# Patient Record
Sex: MALE | Race: WHITE | NOT HISPANIC OR LATINO | Employment: FULL TIME | URBAN - METROPOLITAN AREA
[De-identification: names, ages, dates, MRNs, and addresses within clinical notes are randomized per-mention and may not be internally consistent; named-entity substitution may affect disease eponyms.]

---

## 2017-12-06 ENCOUNTER — ALLSCRIPTS OFFICE VISIT (OUTPATIENT)
Dept: OTHER | Facility: OTHER | Age: 27
End: 2017-12-06

## 2017-12-06 LAB — OCCULT BLD, FECAL IMMUNOLOGICAL (HISTORICAL): NEGATIVE

## 2018-01-22 VITALS
HEIGHT: 69 IN | WEIGHT: 214.4 LBS | TEMPERATURE: 98.2 F | HEART RATE: 74 BPM | SYSTOLIC BLOOD PRESSURE: 138 MMHG | DIASTOLIC BLOOD PRESSURE: 82 MMHG | RESPIRATION RATE: 16 BRPM | BODY MASS INDEX: 31.76 KG/M2

## 2018-08-06 ENCOUNTER — OFFICE VISIT (OUTPATIENT)
Dept: FAMILY MEDICINE CLINIC | Facility: CLINIC | Age: 28
End: 2018-08-06
Payer: COMMERCIAL

## 2018-08-06 VITALS
WEIGHT: 207.4 LBS | TEMPERATURE: 98.3 F | BODY MASS INDEX: 30.72 KG/M2 | SYSTOLIC BLOOD PRESSURE: 130 MMHG | RESPIRATION RATE: 16 BRPM | HEIGHT: 69 IN | DIASTOLIC BLOOD PRESSURE: 80 MMHG | HEART RATE: 72 BPM

## 2018-08-06 DIAGNOSIS — N50.819 TESTICULAR PAIN: ICD-10-CM

## 2018-08-06 DIAGNOSIS — F41.9 ANXIETY: ICD-10-CM

## 2018-08-06 DIAGNOSIS — R03.0 ELEVATED BLOOD PRESSURE READING: Primary | ICD-10-CM

## 2018-08-06 PROBLEM — K64.9 HEMORRHOIDS: Status: ACTIVE | Noted: 2017-12-06

## 2018-08-06 PROCEDURE — 99213 OFFICE O/P EST LOW 20 MIN: CPT | Performed by: FAMILY MEDICINE

## 2018-08-06 PROCEDURE — 3008F BODY MASS INDEX DOCD: CPT | Performed by: FAMILY MEDICINE

## 2018-08-06 PROCEDURE — 1036F TOBACCO NON-USER: CPT | Performed by: FAMILY MEDICINE

## 2018-08-06 PROCEDURE — 93000 ELECTROCARDIOGRAM COMPLETE: CPT | Performed by: FAMILY MEDICINE

## 2018-08-06 NOTE — PATIENT INSTRUCTIONS
Please be sure to keep a log on when and what triggers your anxiety  Please take your blood pressure once a week and specifically the times refill anxious    Please obtain at ultrasound of bilateral testicles  Avoid lifting weights till ultrasound is obtained      If your blood pressure continues to elevate will order a Holter monitor and blood work at her next visit

## 2018-08-06 NOTE — PROGRESS NOTES
Cecil Gosselin 1990 male MRN: [id number]    [facility] ACUTE OFFICE VISIT  [facility] - [address]      ASSESSMENT/PLAN  Cecil Gosselin is a 29 y o  male with no significant PmhX presenting to the office for    1) testicular pain:  -maternal uncle with testicular cancer at the age of 21  Physical exam did not demonstrate any abnormalities however given the family history and patient is concerned will obtain an ultrasound for further evaluation  2) anxiety:  -education given on anxiety at this visit  The patient is to keep a log of what triggers his anxiety  At this time the patient declines therapy  If his anxiety continues to be interrupting his daily activities will consider starting him on an SSRI  3) elevated blood pressure:  EKG within normal limits  At this time would like him to take weekly blood pressures  Will review log at next visit      Disposition:  Return to the office in 1 month    Future Appointments  Date Time Provider David Sanchez   9/5/2018 9:00 AM Mayelin Christian MD [facility]          SUBJECTIVE  CC: Testicle Pain (unfrequently )      HPI:  Cecil Gosselin is a 29 y o  male who presents for an acute appointment  The patient states that he has been having testicular pain on and off  He states that every month he has 1 time testicular pain  He does do testicular checks every month and has not found any masses  The patient has a family history of testicular cancer in his mother's side  Denies any erections/ejaculation concerns  Patient also reports elevated blood pressure with palpitations  He states that he might have anxiety as well secondary to getting   He states that his father and brother has anxiety  He states that he does that time have chest tightness during these anxiety attacks  Denies any shortness of breath or any other complaints at this time    -patient is a weight   -chews tobacco    Review of Systems   Constitutional: Negative for activity change and appetite change  HENT: Negative for congestion and sore throat  Respiratory: Negative for cough, chest tightness and shortness of breath  Cardiovascular: Negative for chest pain  Gastrointestinal: Negative for abdominal distention and abdominal pain  Genitourinary: Positive for scrotal swelling and testicular pain  Musculoskeletal: Negative for arthralgias and back pain  Skin: Negative for rash  Neurological: Negative for dizziness  All other systems reviewed and are negative  Historical Information   The patient history was reviewed as follows:  No past medical history on file  Past Surgical History:   Procedure Laterality Date    APPENDECTOMY       Family History   Problem Relation Age of Onset    Hypertension Father       Social History   History   Alcohol use Not on file     History   Drug use: Unknown     History   Smoking Status    Never Smoker   Smokeless Tobacco    Never Used       Medications:   No current outpatient prescriptions on file  Allergies   Allergen Reactions    Penicillins Rash       OBJECTIVE  Vitals:   Vitals:    08/06/18 1622   BP: 130/80   BP Location: Left arm   Patient Position: Sitting   Cuff Size: Standard   Pulse: 72   Resp: 16   Temp: 98 3 °F (36 8 °C)   Weight: 94 1 kg (207 lb 6 4 oz)   Height: 5' 8 5" (1 74 m)         Physical Exam   Constitutional: He is oriented to person, place, and time  He appears well-developed and well-nourished  HENT:   Head: Normocephalic and atraumatic  Eyes: Conjunctivae and EOM are normal  Pupils are equal, round, and reactive to light  Neck: Normal range of motion  Neck supple  Cardiovascular: Normal rate, regular rhythm, normal heart sounds and intact distal pulses  No murmur heard  Pulmonary/Chest: Effort normal and breath sounds normal  No respiratory distress  He has no wheezes  Abdominal: Soft   Bowel sounds are normal  He exhibits no distension  There is no tenderness  Genitourinary: Penis normal  No penile tenderness  Genitourinary Comments: No hernia/masses felt   Musculoskeletal: Normal range of motion  He exhibits no edema  Neurological: He is alert and oriented to person, place, and time  Skin: Skin is warm and dry  No rash noted  Psychiatric: He has a normal mood and affect  His behavior is normal  Judgment and thought content normal    Vitals reviewed           Jason Hunt MD,   [facility]  8/6/2018

## 2018-11-28 DIAGNOSIS — N50.812 LEFT TESTICULAR PAIN: Primary | ICD-10-CM

## 2018-11-29 ENCOUNTER — TRANSCRIBE ORDERS (OUTPATIENT)
Dept: RADIOLOGY | Facility: CLINIC | Age: 28
End: 2018-11-29

## 2018-11-29 ENCOUNTER — HOSPITAL ENCOUNTER (OUTPATIENT)
Dept: RADIOLOGY | Facility: CLINIC | Age: 28
Discharge: HOME | End: 2018-11-29
Payer: COMMERCIAL

## 2018-11-29 DIAGNOSIS — N50.812 LEFT TESTICULAR PAIN: ICD-10-CM

## 2018-11-29 PROCEDURE — 76870 US EXAM SCROTUM: CPT

## 2020-01-27 ENCOUNTER — OFFICE VISIT (OUTPATIENT)
Dept: FAMILY MEDICINE CLINIC | Facility: CLINIC | Age: 30
End: 2020-01-27
Payer: COMMERCIAL

## 2020-01-27 VITALS
BODY MASS INDEX: 31.1 KG/M2 | WEIGHT: 210 LBS | HEART RATE: 92 BPM | DIASTOLIC BLOOD PRESSURE: 82 MMHG | RESPIRATION RATE: 16 BRPM | HEIGHT: 69 IN | SYSTOLIC BLOOD PRESSURE: 130 MMHG | TEMPERATURE: 98.1 F | OXYGEN SATURATION: 98 %

## 2020-01-27 DIAGNOSIS — Z83.3 FAMILY HISTORY OF DIABETES MELLITUS (DM): ICD-10-CM

## 2020-01-27 DIAGNOSIS — E66.9 OBESITY (BMI 30.0-34.9): ICD-10-CM

## 2020-01-27 DIAGNOSIS — Z83.438 FAMILY HISTORY OF HYPERLIPIDEMIA: ICD-10-CM

## 2020-01-27 DIAGNOSIS — Z11.4 SCREENING FOR HIV (HUMAN IMMUNODEFICIENCY VIRUS): ICD-10-CM

## 2020-01-27 DIAGNOSIS — J01.00 ACUTE NON-RECURRENT MAXILLARY SINUSITIS: Primary | ICD-10-CM

## 2020-01-27 PROCEDURE — 3008F BODY MASS INDEX DOCD: CPT | Performed by: NURSE PRACTITIONER

## 2020-01-27 PROCEDURE — 1036F TOBACCO NON-USER: CPT | Performed by: NURSE PRACTITIONER

## 2020-01-27 PROCEDURE — 99214 OFFICE O/P EST MOD 30 MIN: CPT | Performed by: NURSE PRACTITIONER

## 2020-01-27 RX ORDER — DOXYCYCLINE HYCLATE 100 MG/1
100 CAPSULE ORAL EVERY 12 HOURS SCHEDULED
Qty: 14 CAPSULE | Refills: 0 | Status: SHIPPED | OUTPATIENT
Start: 2020-01-27 | End: 2020-02-03

## 2020-01-27 NOTE — PROGRESS NOTES
Assessment/Plan:  1  Acute non-recurrent maxillary sinusitis  Fluids  Rest  Nasal saline rinses  Symptom management for supportive care such as decongestants, tylenol/motrin as needed for fever or discomfort  Use a cool mist humidifier at bedtime  Finish antibiotics as prescribed  Doxycycline 100mg twice daily for one week  Warm compresses to facilitate nasal drainage  Routine labwork as ordered  - doxycycline hyclate (VIBRAMYCIN) 100 mg capsule; Take 1 capsule (100 mg total) by mouth every 12 (twelve) hours for 7 days  Dispense: 14 capsule; Refill: 0  2  Obesity (BMI 30 0-34  9)  BMI Counseling: Body mass index is 31 01 kg/m²  The BMI is above normal  Nutrition recommendations include reducing portion sizes, decreasing overall calorie intake, reducing fast food intake, consuming healthier snacks and moderation in carbohydrate intake  Exercise recommendations include exercising 3-5 times per week  - CBC and differential; Future  - Comprehensive metabolic panel; Future  - Hemoglobin A1C; Future  - Lipid panel; Future  - TSH, 3rd generation; Future  3  Family history of diabetes mellitus (DM)  - CBC and differential; Future  - Comprehensive metabolic panel; Future  - Hemoglobin A1C; Future  - 4  Screening for HIV (human immunodeficiency virus)  - Human Immunodeficiency Virus 1/2 Antigen / Antibody ( Fourth Generation) with Reflex Testing  5  Family history of hyperlipidemia  - CBC and differential; Future  - Comprehensive metabolic panel; Future  - Lipid panel; Future    Depression Screening Follow-up Plan: Patient's depression screening was positive with a PHQ-2 score of 0  Their PHQ-9 score was 0   Clinically patient does not have depression  No treatment is required  Subjective:      Patient ID: Diallo Ratliff is a 34 y o  male who presents for cold symptoms  Here for cold symptoms for over a week  Worse last 4 days  Nasal congestion  Chest congestion  Sinus pressure   Sore throat comes and goes  Cough  Taking otc meds  Getting worse  Headache  No fever  Taking otc decongestants  Non smoker  No history of asthma  The following portions of the patient's history were reviewed and updated as appropriate: allergies, current medications, past family history, past medical history, past social history, past surgical history and problem list     Review of Systems   Constitutional: Positive for fatigue  Negative for fever  HENT: Positive for congestion, postnasal drip, rhinorrhea, sinus pressure, sinus pain and sore throat  Respiratory: Positive for cough and shortness of breath  Gastrointestinal: Negative for diarrhea, nausea and vomiting  Musculoskeletal: Negative for myalgias  Allergic/Immunologic: Negative for immunocompromised state  Neurological: Positive for headaches  Negative for dizziness  Hematological: Negative for adenopathy  Objective:      /82 (BP Location: Right arm, Patient Position: Sitting, Cuff Size: Adult)   Pulse 92   Temp 98 1 °F (36 7 °C) (Temporal)   Resp 16   Ht 5' 9" (1 753 m)   Wt 95 3 kg (210 lb)   SpO2 98%   BMI 31 01 kg/m²          Physical Exam   Constitutional: He appears well-developed and well-nourished  Ill appearing   HENT:   TMS WNL  Turbinates inflamed  Oropharynx with no erythema or exudate    (+) maxillary sinus tenderness to palpation    (+) PND     Cardiovascular: Normal rate and regular rhythm  Pulmonary/Chest: Effort normal and breath sounds normal  No respiratory distress  He has no wheezes  Lymphadenopathy:     He has no cervical adenopathy  Skin: Skin is warm and dry  No rash noted  No erythema  No pallor  Psychiatric: He has a normal mood and affect  His behavior is normal  Judgment and thought content normal    Vitals reviewed

## 2020-01-27 NOTE — PATIENT INSTRUCTIONS
Fluids  Rest  Nasal saline rinses  Symptom management for supportive care such as decongestants, tylenol/motrin as needed for fever or discomfort  Use a cool mist humidifier at bedtime  Finish antibiotics as prescribed  Doxycycline 100mg twice daily for one week  Warm compresses to facilitate nasal drainage  Routine labwork as ordered

## 2020-04-21 ENCOUNTER — HOSPITAL ENCOUNTER (OUTPATIENT)
Facility: HOSPITAL | Age: 30
Setting detail: OBSERVATION
Discharge: HOME | End: 2020-04-22
Attending: EMERGENCY MEDICINE | Admitting: INTERNAL MEDICINE
Payer: COMMERCIAL

## 2020-04-21 DIAGNOSIS — K22.2 ESOPHAGEAL OBSTRUCTION DUE TO FOOD IMPACTION: Primary | ICD-10-CM

## 2020-04-21 DIAGNOSIS — T18.128A ESOPHAGEAL OBSTRUCTION DUE TO FOOD IMPACTION: Primary | ICD-10-CM

## 2020-04-21 DIAGNOSIS — K20.90 ESOPHAGITIS: ICD-10-CM

## 2020-04-21 LAB
ANION GAP SERPL CALCULATED.3IONS-SCNC: 6 MMOL/L (ref 4–13)
BASOPHILS # BLD AUTO: 0.05 THOUSANDS/ΜL (ref 0–0.1)
BASOPHILS NFR BLD AUTO: 1 % (ref 0–1)
BUN SERPL-MCNC: 12 MG/DL (ref 5–25)
CALCIUM SERPL-MCNC: 9.1 MG/DL (ref 8.3–10.1)
CHLORIDE SERPL-SCNC: 100 MMOL/L (ref 100–108)
CO2 SERPL-SCNC: 31 MMOL/L (ref 21–32)
CREAT SERPL-MCNC: 0.97 MG/DL (ref 0.6–1.3)
EOSINOPHIL # BLD AUTO: 0.29 THOUSAND/ΜL (ref 0–0.61)
EOSINOPHIL NFR BLD AUTO: 4 % (ref 0–6)
ERYTHROCYTE [DISTWIDTH] IN BLOOD BY AUTOMATED COUNT: 12.2 % (ref 11.6–15.1)
GFR SERPL CREATININE-BSD FRML MDRD: 105 ML/MIN/1.73SQ M
GLUCOSE SERPL-MCNC: 85 MG/DL (ref 65–140)
HCT VFR BLD AUTO: 43.7 % (ref 36.5–49.3)
HGB BLD-MCNC: 14.5 G/DL (ref 12–17)
IMM GRANULOCYTES # BLD AUTO: 0.02 THOUSAND/UL (ref 0–0.2)
IMM GRANULOCYTES NFR BLD AUTO: 0 % (ref 0–2)
LYMPHOCYTES # BLD AUTO: 2.56 THOUSANDS/ΜL (ref 0.6–4.47)
LYMPHOCYTES NFR BLD AUTO: 33 % (ref 14–44)
MCH RBC QN AUTO: 30.3 PG (ref 26.8–34.3)
MCHC RBC AUTO-ENTMCNC: 33.2 G/DL (ref 31.4–37.4)
MCV RBC AUTO: 91 FL (ref 82–98)
MONOCYTES # BLD AUTO: 0.55 THOUSAND/ΜL (ref 0.17–1.22)
MONOCYTES NFR BLD AUTO: 7 % (ref 4–12)
NEUTROPHILS # BLD AUTO: 4.34 THOUSANDS/ΜL (ref 1.85–7.62)
NEUTS SEG NFR BLD AUTO: 55 % (ref 43–75)
NRBC BLD AUTO-RTO: 0 /100 WBCS
PLATELET # BLD AUTO: 289 THOUSANDS/UL (ref 149–390)
PMV BLD AUTO: 9.4 FL (ref 8.9–12.7)
POTASSIUM SERPL-SCNC: 4.7 MMOL/L (ref 3.5–5.3)
RBC # BLD AUTO: 4.78 MILLION/UL (ref 3.88–5.62)
SODIUM SERPL-SCNC: 137 MMOL/L (ref 136–145)
WBC # BLD AUTO: 7.81 THOUSAND/UL (ref 4.31–10.16)

## 2020-04-21 PROCEDURE — 96374 THER/PROPH/DIAG INJ IV PUSH: CPT

## 2020-04-21 PROCEDURE — 99284 EMERGENCY DEPT VISIT MOD MDM: CPT

## 2020-04-21 PROCEDURE — 99220 PR INITIAL OBSERVATION CARE/DAY 70 MINUTES: CPT | Performed by: NURSE PRACTITIONER

## 2020-04-21 PROCEDURE — 36415 COLL VENOUS BLD VENIPUNCTURE: CPT | Performed by: EMERGENCY MEDICINE

## 2020-04-21 PROCEDURE — 80048 BASIC METABOLIC PNL TOTAL CA: CPT | Performed by: EMERGENCY MEDICINE

## 2020-04-21 PROCEDURE — 85025 COMPLETE CBC W/AUTO DIFF WBC: CPT | Performed by: EMERGENCY MEDICINE

## 2020-04-21 PROCEDURE — 99284 EMERGENCY DEPT VISIT MOD MDM: CPT | Performed by: EMERGENCY MEDICINE

## 2020-04-21 RX ORDER — SODIUM CHLORIDE 9 MG/ML
75 INJECTION, SOLUTION INTRAVENOUS CONTINUOUS
Status: DISCONTINUED | OUTPATIENT
Start: 2020-04-22 | End: 2020-04-22 | Stop reason: HOSPADM

## 2020-04-21 RX ORDER — ONDANSETRON 2 MG/ML
4 INJECTION INTRAMUSCULAR; INTRAVENOUS EVERY 6 HOURS PRN
Status: DISCONTINUED | OUTPATIENT
Start: 2020-04-21 | End: 2020-04-22 | Stop reason: HOSPADM

## 2020-04-21 RX ADMIN — GLUCAGON HYDROCHLORIDE 1 MG: KIT at 22:59

## 2020-04-22 ENCOUNTER — ANESTHESIA EVENT (OUTPATIENT)
Dept: PERIOP | Facility: HOSPITAL | Age: 30
End: 2020-04-22
Payer: COMMERCIAL

## 2020-04-22 ENCOUNTER — TELEPHONE (OUTPATIENT)
Dept: GASTROENTEROLOGY | Facility: AMBULARY SURGERY CENTER | Age: 30
End: 2020-04-22

## 2020-04-22 ENCOUNTER — ANESTHESIA (OUTPATIENT)
Dept: PERIOP | Facility: HOSPITAL | Age: 30
End: 2020-04-22
Payer: COMMERCIAL

## 2020-04-22 ENCOUNTER — APPOINTMENT (OUTPATIENT)
Dept: PERIOP | Facility: HOSPITAL | Age: 30
End: 2020-04-22
Payer: COMMERCIAL

## 2020-04-22 VITALS
RESPIRATION RATE: 16 BRPM | HEIGHT: 70 IN | HEART RATE: 59 BPM | BODY MASS INDEX: 29.95 KG/M2 | SYSTOLIC BLOOD PRESSURE: 127 MMHG | OXYGEN SATURATION: 98 % | DIASTOLIC BLOOD PRESSURE: 78 MMHG | TEMPERATURE: 98 F | WEIGHT: 209.22 LBS

## 2020-04-22 PROBLEM — K20.90 ESOPHAGITIS: Status: ACTIVE | Noted: 2020-04-22

## 2020-04-22 PROBLEM — K22.2 ESOPHAGEAL OBSTRUCTION: Status: RESOLVED | Noted: 2020-04-21 | Resolved: 2020-04-22

## 2020-04-22 LAB
ANION GAP SERPL CALCULATED.3IONS-SCNC: 5 MMOL/L (ref 4–13)
BASOPHILS # BLD AUTO: 0.06 THOUSANDS/ΜL (ref 0–0.1)
BASOPHILS NFR BLD AUTO: 1 % (ref 0–1)
BUN SERPL-MCNC: 12 MG/DL (ref 5–25)
CALCIUM SERPL-MCNC: 8.7 MG/DL (ref 8.3–10.1)
CHLORIDE SERPL-SCNC: 103 MMOL/L (ref 100–108)
CO2 SERPL-SCNC: 31 MMOL/L (ref 21–32)
CREAT SERPL-MCNC: 0.98 MG/DL (ref 0.6–1.3)
EOSINOPHIL # BLD AUTO: 0.31 THOUSAND/ΜL (ref 0–0.61)
EOSINOPHIL NFR BLD AUTO: 4 % (ref 0–6)
ERYTHROCYTE [DISTWIDTH] IN BLOOD BY AUTOMATED COUNT: 12.2 % (ref 11.6–15.1)
GFR SERPL CREATININE-BSD FRML MDRD: 104 ML/MIN/1.73SQ M
GLUCOSE SERPL-MCNC: 76 MG/DL (ref 65–140)
HCT VFR BLD AUTO: 42 % (ref 36.5–49.3)
HGB BLD-MCNC: 13.8 G/DL (ref 12–17)
IMM GRANULOCYTES # BLD AUTO: 0.01 THOUSAND/UL (ref 0–0.2)
IMM GRANULOCYTES NFR BLD AUTO: 0 % (ref 0–2)
LYMPHOCYTES # BLD AUTO: 2.87 THOUSANDS/ΜL (ref 0.6–4.47)
LYMPHOCYTES NFR BLD AUTO: 39 % (ref 14–44)
MCH RBC QN AUTO: 30.1 PG (ref 26.8–34.3)
MCHC RBC AUTO-ENTMCNC: 32.9 G/DL (ref 31.4–37.4)
MCV RBC AUTO: 92 FL (ref 82–98)
MONOCYTES # BLD AUTO: 0.53 THOUSAND/ΜL (ref 0.17–1.22)
MONOCYTES NFR BLD AUTO: 7 % (ref 4–12)
NEUTROPHILS # BLD AUTO: 3.54 THOUSANDS/ΜL (ref 1.85–7.62)
NEUTS SEG NFR BLD AUTO: 49 % (ref 43–75)
NRBC BLD AUTO-RTO: 0 /100 WBCS
PLATELET # BLD AUTO: 282 THOUSANDS/UL (ref 149–390)
PMV BLD AUTO: 9.8 FL (ref 8.9–12.7)
POTASSIUM SERPL-SCNC: 3.8 MMOL/L (ref 3.5–5.3)
RBC # BLD AUTO: 4.59 MILLION/UL (ref 3.88–5.62)
SODIUM SERPL-SCNC: 139 MMOL/L (ref 136–145)
WBC # BLD AUTO: 7.32 THOUSAND/UL (ref 4.31–10.16)

## 2020-04-22 PROCEDURE — 43247 EGD REMOVE FOREIGN BODY: CPT

## 2020-04-22 PROCEDURE — 88305 TISSUE EXAM BY PATHOLOGIST: CPT | Performed by: PATHOLOGY

## 2020-04-22 PROCEDURE — 43239 EGD BIOPSY SINGLE/MULTIPLE: CPT

## 2020-04-22 PROCEDURE — 80048 BASIC METABOLIC PNL TOTAL CA: CPT | Performed by: NURSE PRACTITIONER

## 2020-04-22 PROCEDURE — 99244 OFF/OP CNSLTJ NEW/EST MOD 40: CPT | Performed by: INTERNAL MEDICINE

## 2020-04-22 PROCEDURE — 99217 PR OBSERVATION CARE DISCHARGE MANAGEMENT: CPT | Performed by: FAMILY MEDICINE

## 2020-04-22 PROCEDURE — 85025 COMPLETE CBC W/AUTO DIFF WBC: CPT | Performed by: NURSE PRACTITIONER

## 2020-04-22 RX ORDER — PROPOFOL 10 MG/ML
INJECTION, EMULSION INTRAVENOUS CONTINUOUS PRN
Status: DISCONTINUED | OUTPATIENT
Start: 2020-04-22 | End: 2020-04-22 | Stop reason: SURG

## 2020-04-22 RX ORDER — LIDOCAINE HYDROCHLORIDE 20 MG/ML
INJECTION, SOLUTION EPIDURAL; INFILTRATION; INTRACAUDAL; PERINEURAL AS NEEDED
Status: DISCONTINUED | OUTPATIENT
Start: 2020-04-22 | End: 2020-04-22 | Stop reason: SURG

## 2020-04-22 RX ORDER — PANTOPRAZOLE SODIUM 40 MG/1
40 TABLET, DELAYED RELEASE ORAL 2 TIMES DAILY
Qty: 120 TABLET | Refills: 0 | Status: SHIPPED | OUTPATIENT
Start: 2020-04-22 | End: 2021-09-28 | Stop reason: ALTCHOICE

## 2020-04-22 RX ORDER — LIDOCAINE HYDROCHLORIDE 10 MG/ML
INJECTION, SOLUTION EPIDURAL; INFILTRATION; INTRACAUDAL; PERINEURAL AS NEEDED
Status: DISCONTINUED | OUTPATIENT
Start: 2020-04-22 | End: 2020-04-22

## 2020-04-22 RX ORDER — PROPOFOL 10 MG/ML
INJECTION, EMULSION INTRAVENOUS AS NEEDED
Status: DISCONTINUED | OUTPATIENT
Start: 2020-04-22 | End: 2020-04-22 | Stop reason: SURG

## 2020-04-22 RX ADMIN — PROPOFOL 150 MCG/KG/MIN: 10 INJECTION, EMULSION INTRAVENOUS at 12:20

## 2020-04-22 RX ADMIN — PROPOFOL 30 MG: 10 INJECTION, EMULSION INTRAVENOUS at 12:21

## 2020-04-22 RX ADMIN — SODIUM CHLORIDE 75 ML/HR: 0.9 INJECTION, SOLUTION INTRAVENOUS at 00:15

## 2020-04-22 RX ADMIN — LIDOCAINE HYDROCHLORIDE 100 MG: 20 INJECTION, SOLUTION EPIDURAL; INFILTRATION; INTRACAUDAL; PERINEURAL at 12:20

## 2020-04-22 RX ADMIN — PROPOFOL 20 MG: 10 INJECTION, EMULSION INTRAVENOUS at 12:30

## 2020-04-22 RX ADMIN — PROPOFOL 100 MG: 10 INJECTION, EMULSION INTRAVENOUS at 12:20

## 2020-04-22 RX ADMIN — SODIUM CHLORIDE 75 ML/HR: 0.9 INJECTION, SOLUTION INTRAVENOUS at 11:48

## 2020-04-23 ENCOUNTER — TRANSITIONAL CARE MANAGEMENT (OUTPATIENT)
Dept: FAMILY MEDICINE CLINIC | Facility: CLINIC | Age: 30
End: 2020-04-23

## 2020-05-08 ENCOUNTER — TELEMEDICINE (OUTPATIENT)
Dept: FAMILY MEDICINE CLINIC | Facility: CLINIC | Age: 30
End: 2020-05-08
Payer: COMMERCIAL

## 2020-05-08 VITALS — WEIGHT: 210 LBS | TEMPERATURE: 98.6 F | BODY MASS INDEX: 30.06 KG/M2 | HEIGHT: 70 IN

## 2020-05-08 DIAGNOSIS — L23.7 POISON IVY DERMATITIS: ICD-10-CM

## 2020-05-08 DIAGNOSIS — K20.90 ESOPHAGITIS: Primary | ICD-10-CM

## 2020-05-08 PROCEDURE — 3008F BODY MASS INDEX DOCD: CPT | Performed by: NURSE PRACTITIONER

## 2020-05-08 PROCEDURE — 1111F DSCHRG MED/CURRENT MED MERGE: CPT | Performed by: NURSE PRACTITIONER

## 2020-05-08 PROCEDURE — 99214 OFFICE O/P EST MOD 30 MIN: CPT | Performed by: NURSE PRACTITIONER

## 2020-05-15 ENCOUNTER — TELEPHONE (OUTPATIENT)
Dept: GASTROENTEROLOGY | Facility: AMBULARY SURGERY CENTER | Age: 30
End: 2020-05-15

## 2020-05-29 ENCOUNTER — TELEPHONE (OUTPATIENT)
Dept: GASTROENTEROLOGY | Facility: AMBULARY SURGERY CENTER | Age: 30
End: 2020-05-29

## 2020-12-17 ENCOUNTER — TELEPHONE (OUTPATIENT)
Dept: FAMILY MEDICINE CLINIC | Facility: CLINIC | Age: 30
End: 2020-12-17

## 2021-01-22 ENCOUNTER — TELEPHONE (OUTPATIENT)
Dept: FAMILY MEDICINE CLINIC | Facility: CLINIC | Age: 31
End: 2021-01-22

## 2021-06-30 ENCOUNTER — OFFICE VISIT (OUTPATIENT)
Dept: URGENT CARE | Facility: CLINIC | Age: 31
End: 2021-06-30
Payer: COMMERCIAL

## 2021-06-30 ENCOUNTER — TELEPHONE (OUTPATIENT)
Dept: FAMILY MEDICINE CLINIC | Facility: CLINIC | Age: 31
End: 2021-06-30

## 2021-06-30 VITALS
BODY MASS INDEX: 31.64 KG/M2 | SYSTOLIC BLOOD PRESSURE: 138 MMHG | HEART RATE: 88 BPM | OXYGEN SATURATION: 99 % | HEIGHT: 70 IN | WEIGHT: 221 LBS | DIASTOLIC BLOOD PRESSURE: 88 MMHG | RESPIRATION RATE: 16 BRPM | TEMPERATURE: 98.7 F

## 2021-06-30 DIAGNOSIS — T63.441A ALLERGIC REACTION TO BEE STING: Primary | ICD-10-CM

## 2021-06-30 PROCEDURE — 1036F TOBACCO NON-USER: CPT | Performed by: PHYSICIAN ASSISTANT

## 2021-06-30 PROCEDURE — 99203 OFFICE O/P NEW LOW 30 MIN: CPT | Performed by: PHYSICIAN ASSISTANT

## 2021-06-30 PROCEDURE — 3008F BODY MASS INDEX DOCD: CPT | Performed by: PHYSICIAN ASSISTANT

## 2021-06-30 RX ORDER — FAMOTIDINE 20 MG/1
20 TABLET, FILM COATED ORAL ONCE
Status: COMPLETED | OUTPATIENT
Start: 2021-06-30 | End: 2021-06-30

## 2021-06-30 RX ORDER — DIPHENHYDRAMINE HYDROCHLORIDE 50 MG/ML
50 INJECTION INTRAMUSCULAR; INTRAVENOUS ONCE
Status: COMPLETED | OUTPATIENT
Start: 2021-06-30 | End: 2021-06-30

## 2021-06-30 RX ORDER — DEXAMETHASONE SODIUM PHOSPHATE 4 MG/ML
4 INJECTION, SOLUTION INTRA-ARTICULAR; INTRALESIONAL; INTRAMUSCULAR; INTRAVENOUS; SOFT TISSUE ONCE
Status: COMPLETED | OUTPATIENT
Start: 2021-06-30 | End: 2021-06-30

## 2021-06-30 RX ORDER — PREDNISONE 10 MG/1
TABLET ORAL
Qty: 18 TABLET | Refills: 0 | Status: SHIPPED | OUTPATIENT
Start: 2021-06-30 | End: 2021-09-28 | Stop reason: ALTCHOICE

## 2021-06-30 RX ADMIN — DEXAMETHASONE SODIUM PHOSPHATE 4 MG: 4 INJECTION, SOLUTION INTRA-ARTICULAR; INTRALESIONAL; INTRAMUSCULAR; INTRAVENOUS; SOFT TISSUE at 11:45

## 2021-06-30 RX ADMIN — DIPHENHYDRAMINE HYDROCHLORIDE 50 MG: 50 INJECTION INTRAMUSCULAR; INTRAVENOUS at 11:51

## 2021-06-30 RX ADMIN — FAMOTIDINE 20 MG: 20 TABLET, FILM COATED ORAL at 11:53

## 2021-06-30 NOTE — TELEPHONE ENCOUNTER
Patient called stating he was stung by a bee three times on his ankles forty minutes ago  He is experiencing swelling in his face and a rash on his arms  Patient denied shortness of breath  Advised by Sheryle Squire to go to [facility] or ED      ANALILIA Last/ROSEANNE

## 2021-06-30 NOTE — PROGRESS NOTES
[facility]        NAME: Alix Simon is a 27 y o  male  : 1990    MRN: [id number]  DATE: 2021  TIME: 11:45 AM    Assessment and Plan   Allergic reaction to bee sting [T63 441A]  1  Allergic reaction to bee sting  famotidine (PEPCID) tablet 20 mg    diphenhydrAMINE (BENADRYL) injection 50 mg    dexamethasone (DECADRON) injection 4 mg    predniSONE 10 mg tablet     Vitals improved and improved rash/symptoms after decadron and benadryl and pepcid    Patient Instructions   There are no Patient Instructions on file for this visit  Follow up with PCP in 3-5 days  Proceed to  ER if symptoms worsen  Chief Complaint     Chief Complaint   Patient presents with    Allergic Reaction     Pt reports of worsening allergic reaction from a yellowjacket sting occurred approx 30 minutes ago  Hives and swelling noted  History of Present Illness       75-year-old male presents the clinic with rash itching all over his body about an hour after a bee sting  He was done couple weeks ago without this reaction  He says he has some swelling around his mouth and his face  Some scratchy throat but no trouble breathing or swallowing  Review of Systems   Review of Systems   Constitutional: Negative for chills and fever  HENT: Negative  Negative for trouble swallowing and voice change  Gastrointestinal: Negative  Musculoskeletal: Negative  Skin: Positive for rash           Current Medications       Current Outpatient Medications:     Ascorbic Acid (VITAMIN C PO), Take by mouth daily, Disp: , Rfl:     pantoprazole (PROTONIX) 40 mg tablet, Take 1 tablet (40 mg total) by mouth 2 (two) times a day, Disp: 120 tablet, Rfl: 0    predniSONE 10 mg tablet, 3 tabs daily for 3 days, 2 tabs daily for 3 days, 1 tab daily for 3 days, Disp: 18 tablet, Rfl: 0    Current Facility-Administered Medications:     dexamethasone (DECADRON) injection 4 mg, 4 mg, Intramuscular, Once, Andrzej Waggoner MARICARMEN Serrano    diphenhydrAMINE (BENADRYL) injection 50 mg, 50 mg, Intramuscular, Once, Kristopher Cantu PA-C    famotidine (PEPCID) tablet 20 mg, 20 mg, Oral, Once, Kristopher Cantu PA-C    Current Allergies     Allergies as of 06/30/2021 - Reviewed 06/30/2021   Allergen Reaction Noted    Penicillins Rash 03/10/2014            The following portions of the patient's history were reviewed and updated as appropriate: allergies, current medications, past family history, past medical history, past social history, past surgical history and problem list      Past Medical History:   Diagnosis Date    Asthma     Reactive airway disease        Past Surgical History:   Procedure Laterality Date    APPENDECTOMY         Family History   Problem Relation Age of Onset    Hypertension Father     Hypertension Mother     Diabetes Mother     Dementia Maternal Grandmother     Mental illness Neg Hx     Substance Abuse Neg Hx          Medications have been verified  Objective   /100   Pulse (!) 129   Temp 98 7 °F (37 1 °C)   Resp 18   Ht 5' 10" (1 778 m)   Wt 100 kg (221 lb)   SpO2 99%   BMI 31 71 kg/m²        Physical Exam     Physical Exam  Constitutional:       General: He is not in acute distress  Appearance: He is well-developed  HENT:      Head: Normocephalic and atraumatic  Mouth/Throat:      Pharynx: Oropharynx is clear  No pharyngeal swelling, posterior oropharyngeal erythema or uvula swelling  Eyes:      Conjunctiva/sclera: Conjunctivae normal    Cardiovascular:      Rate and Rhythm: Normal rate and regular rhythm  Pulmonary:      Effort: Pulmonary effort is normal       Breath sounds: Normal breath sounds  Skin:     Comments: Patient has erythematous papules and wheals diffuse over bilateral upper and lower extremities  He has lesions on the neck and some surrounding the mouth  No swelling of the lips     Neurological:      Mental Status: He is alert and oriented to person, place, and time

## 2021-08-04 ENCOUNTER — OFFICE VISIT (OUTPATIENT)
Dept: URGENT CARE | Facility: CLINIC | Age: 31
End: 2021-08-04
Payer: COMMERCIAL

## 2021-08-04 VITALS
DIASTOLIC BLOOD PRESSURE: 86 MMHG | OXYGEN SATURATION: 100 % | HEART RATE: 84 BPM | RESPIRATION RATE: 20 BRPM | WEIGHT: 225 LBS | HEIGHT: 69 IN | BODY MASS INDEX: 33.33 KG/M2 | SYSTOLIC BLOOD PRESSURE: 139 MMHG | TEMPERATURE: 97.1 F

## 2021-08-04 DIAGNOSIS — T63.441A BEE STING REACTION, ACCIDENTAL OR UNINTENTIONAL, INITIAL ENCOUNTER: Primary | ICD-10-CM

## 2021-08-04 PROCEDURE — 99213 OFFICE O/P EST LOW 20 MIN: CPT | Performed by: FAMILY MEDICINE

## 2021-08-04 PROCEDURE — 3008F BODY MASS INDEX DOCD: CPT | Performed by: PHYSICIAN ASSISTANT

## 2021-08-04 NOTE — PROGRESS NOTES
[facility]        NAME: Sebastian Hatch is a 32 y o  male  : 1990    MRN: [id number]  DATE: 2021  TIME: 5:17 PM    Assessment and Plan   Bee sting reaction, accidental or unintentional, initial encounter [T63 441A]  1  Bee sting reaction, accidental or unintentional, initial encounter       With Benadryl in his system, his current presentation is mild for Hymenoptera reaction  However, this is in contrast to a previous reaction resulting in angioedema requiring p o  Steroid and antihistamines  Advised on taking a daily antihistamine such as Claritin, Allegra or Zyrtec twice daily whenever he comes in contact with 1 of his allergy triggers  Dispense medication:  Cetirizine 10 mg; hydrocortisone 1%    Patient Instructions     Follow up with PCP in 3-5 days  Proceed to  ER if symptoms worsen  Chief Complaint     Chief Complaint   Patient presents with    Insect Bite     stung three times by hornet on bilateral legs  started to miri hives on arms  Took 50mg of Benadryl   Hives have subsiided  has slight tightness in chest but no respiratory issues         History of Present Illness       19-year-old male presents today due to 2-3 bee stings which occurred about 2 hours ago  An hour later he took some Benadryl due to acute onset of hives on the upper extremities  Currently on presentation, he reports resolution of the hives on the upper extremities  However he still has irritation at the sting sites on both lower extremities  Denies any other associated symptoms such as choking or angioedema like symptoms  Review of Systems   Review of Systems   Constitutional: Negative for chills and fever  Respiratory: Positive for chest tightness (Now resolved)  Negative for cough, shortness of breath and wheezing  Cardiovascular: Negative for chest pain  Gastrointestinal: Negative for abdominal pain and nausea  Skin: Negative for rash     Neurological: Negative for dizziness and headaches  Current Medications       Current Outpatient Medications:     Ascorbic Acid (VITAMIN C PO), Take by mouth daily (Patient not taking: Reported on 8/4/2021), Disp: , Rfl:     pantoprazole (PROTONIX) 40 mg tablet, Take 1 tablet (40 mg total) by mouth 2 (two) times a day, Disp: 120 tablet, Rfl: 0    predniSONE 10 mg tablet, 3 tabs daily for 3 days, 2 tabs daily for 3 days, 1 tab daily for 3 days (Patient not taking: Reported on 8/4/2021), Disp: 18 tablet, Rfl: 0    Current Allergies     Allergies as of 08/04/2021 - Reviewed 08/04/2021   Allergen Reaction Noted    Penicillins Rash 03/10/2014            The following portions of the patient's history were reviewed and updated as appropriate: allergies, current medications, past family history, past medical history, past social history, past surgical history and problem list      Past Medical History:   Diagnosis Date    Asthma     Reactive airway disease        Past Surgical History:   Procedure Laterality Date    APPENDECTOMY         Family History   Problem Relation Age of Onset    Hypertension Father     Hypertension Mother     Diabetes Mother     Dementia Maternal Grandmother     Mental illness Neg Hx     Substance Abuse Neg Hx          Medications have been verified  Objective   /86   Pulse 84   Temp (!) 97 1 °F (36 2 °C)   Resp 20   Ht 5' 9" (1 753 m)   Wt 102 kg (225 lb)   SpO2 100%   BMI 33 23 kg/m²   No LMP for male patient  Physical Exam     Physical Exam  Vitals and nursing note reviewed  Constitutional:       General: He is not in acute distress  Appearance: Normal appearance  He is not ill-appearing, toxic-appearing or diaphoretic  HENT:      Head: Normocephalic and atraumatic  Mouth/Throat:      Mouth: Mucous membranes are moist       Pharynx: No posterior oropharyngeal erythema  Eyes:      General:         Right eye: No discharge  Left eye: No discharge  Conjunctiva/sclera: Conjunctivae normal    Pulmonary:      Effort: Pulmonary effort is normal    Skin:     General: Skin is warm  Findings: Lesion (Two insect bites over both Achilles tendons  Minimal erythema ) present  No erythema  Neurological:      General: No focal deficit present  Mental Status: He is alert and oriented to person, place, and time  Psychiatric:         Mood and Affect: Mood normal          Behavior: Behavior normal          Thought Content:  Thought content normal          Judgment: Judgment normal

## 2021-09-28 ENCOUNTER — OFFICE VISIT (OUTPATIENT)
Dept: URGENT CARE | Facility: CLINIC | Age: 31
End: 2021-09-28
Payer: COMMERCIAL

## 2021-09-28 VITALS — OXYGEN SATURATION: 99 % | RESPIRATION RATE: 16 BRPM | TEMPERATURE: 97.5 F | HEART RATE: 85 BPM

## 2021-09-28 DIAGNOSIS — B37.2 CANDIDAL INTERTRIGO: Primary | ICD-10-CM

## 2021-09-28 PROBLEM — H52.229 REGULAR ASTIGMATISM: Status: ACTIVE | Noted: 2021-09-28

## 2021-09-28 PROBLEM — H52.00 HYPEROPIA: Status: ACTIVE | Noted: 2021-09-28

## 2021-09-28 PROBLEM — E66.3 OVERWEIGHT: Status: ACTIVE | Noted: 2021-09-28

## 2021-09-28 PROBLEM — Z01.10 OTHER EXAMINATION OF EARS AND HEARING: Status: ACTIVE | Noted: 2021-09-28

## 2021-09-28 PROBLEM — Z02.89 HEALTH EXAMINATION OF DEFINED SUBPOPULATION: Status: ACTIVE | Noted: 2021-09-28

## 2021-09-28 PROBLEM — F17.200 NICOTINE DEPENDENCE: Status: ACTIVE | Noted: 2021-09-28

## 2021-09-28 PROBLEM — Z72.0 TOBACCO USER: Status: ACTIVE | Noted: 2021-09-28

## 2021-09-28 PROBLEM — Z48.89 OTHER SPECIFIED AFTERCARE FOLLOWING SURGERY: Status: ACTIVE | Noted: 2021-09-28

## 2021-09-28 PROBLEM — R03.0 FINDING OF ABOVE NORMAL BLOOD PRESSURE: Status: ACTIVE | Noted: 2021-09-28

## 2021-09-28 PROCEDURE — 99213 OFFICE O/P EST LOW 20 MIN: CPT | Performed by: FAMILY MEDICINE

## 2021-09-28 RX ORDER — NYSTATIN 100000 [USP'U]/G
POWDER TOPICAL
Qty: 60 G | Refills: 0 | Status: SHIPPED | OUTPATIENT
Start: 2021-09-28

## 2021-09-28 NOTE — PROGRESS NOTES
[facility]        NAME: Saji Yang is a 32 y o  male  : 1990    MRN: [id number]  DATE: 2021  TIME: 6:35 PM    Assessment and Plan   Candidal intertrigo [B37 2]  1  Candidal intertrigo  nystatin (MYCOSTATIN) powder         Patient Instructions     Patient Instructions   1  Candidal Intertrigo  - clinical presentation is consistent with a candidal intertrigo infection vs tinea cruris based on physical exam and there is involvement of the scrotum, whereas with tinea cruris the scrotum is generally not involved  - Nystatin powder prescribed, to be applied to the affected areas twice daily  - patient has been instructed to gently wash the skin with soap and water daily, keep the skin clean and dry, wear loose comfortable clothing with breathable fabrics, and minimize moisture build up in the affected areas  - patient is to follow up w/ his PCP office for re-check within 1 week   - if symptoms persist despite treatment or worsen, he is to follow up with his PCP or dermatology     Follow up with PCP in 3-5 days  Proceed to  ER if symptoms worsen  Chief Complaint     Chief Complaint   Patient presents with    Rash     rash in bilateral groin region x 1 month         History of Present Illness       33 yo male presents for a rash in the bilateral groin region that has been present for the past 1 month  The rash also involves the inner aspects of both thighs and the skin of the scrotum  Patient describes the rash as red and very itchy  He does note some scattered red bumps in the area  There is some slight breakdown of the skin in the folds of the groin region  Patient denies any bleeding or pus like discharge from the rash  No new clothing or skin products used  No testicular pain or swelling  No penile pain, rashes, or discharge  No UTI like symptoms  Patient denies any history of or concerns for STDs  Patient states he has never had symptoms like this before   He states the rash began the day he was working outdoors in his yard and the weather was hot causing excess sweating and moisture build up in that area  He states he has been using topical Hydrocortisone cream and started using Lotrimin spray on it yesterday, however notes no improvement in the symptoms  No rash any where else on the body  No fever/chills  No headache or body aches  He denies any tinea pedis infections  Review of Systems   Review of Systems   Constitutional: Negative  Respiratory: Negative  Cardiovascular: Negative  Gastrointestinal: Negative  Genitourinary: Negative  Musculoskeletal: Negative  Skin: Positive for rash  As noted in HPI   Allergic/Immunologic:        Penicillin   Hematological: Negative  Current Medications       Current Outpatient Medications:     nystatin (MYCOSTATIN) powder, Apply to the affected areas twice daily, Disp: 60 g, Rfl: 0    Current Allergies     Allergies as of 09/28/2021 - Reviewed 09/28/2021   Allergen Reaction Noted    Penicillins Rash 03/10/2014            The following portions of the patient's history were reviewed and updated as appropriate: allergies, current medications, past family history, past medical history, past social history, past surgical history and problem list      Past Medical History:   Diagnosis Date    Asthma     Reactive airway disease        Past Surgical History:   Procedure Laterality Date    APPENDECTOMY         Family History   Problem Relation Age of Onset    Hypertension Father     Hypertension Mother     Diabetes Mother     Dementia Maternal Grandmother     Mental illness Neg Hx     Substance Abuse Neg Hx          Medications have been verified  Objective   Pulse 85   Temp 97 5 °F (36 4 °C)   Resp 16   SpO2 99%   No LMP for male patient  Physical Exam     Physical Exam  Vitals and nursing note reviewed  Constitutional:       General: He is awake  He is not in acute distress  Appearance: Normal appearance  He is well-developed, well-groomed and normal weight  He is not ill-appearing, toxic-appearing or diaphoretic  Cardiovascular:      Rate and Rhythm: Normal rate  Pulses: Normal pulses  Pulmonary:      Effort: Pulmonary effort is normal  No tachypnea, accessory muscle usage or respiratory distress  Lymphadenopathy:      Lower Body: No right inguinal adenopathy  No left inguinal adenopathy  Skin:     General: Skin is warm  Findings: Erythema and rash present  Comments: Rash present in bilateral groin involving the inner thighs and involving the skin of the scrotum  The rash is erythematous with poorly demarcated borders  There are smaller scattered papular and pustular lesions along the borders of the rash  There is some maceration of the skin in the groin folds with mild tenderness to palpation in these areas  No oozing/pus discharge or bleeding noted  No open wounds  Neurological:      Mental Status: He is alert and oriented to person, place, and time  Mental status is at baseline  Psychiatric:         Attention and Perception: Attention and perception normal          Mood and Affect: Mood and affect normal          Speech: Speech normal          Behavior: Behavior normal  Behavior is cooperative  Thought Content:  Thought content normal          Cognition and Memory: Cognition and memory normal          Judgment: Judgment normal

## 2022-10-12 PROBLEM — Z02.89 HEALTH EXAMINATION OF DEFINED SUBPOPULATION: Status: RESOLVED | Noted: 2021-09-28 | Resolved: 2022-10-12

## 2024-12-30 ENCOUNTER — HOSPITAL ENCOUNTER (EMERGENCY)
Facility: HOSPITAL | Age: 34
Discharge: HOME | End: 2024-12-30
Attending: EMERGENCY MEDICINE
Payer: COMMERCIAL

## 2024-12-30 ENCOUNTER — APPOINTMENT (EMERGENCY)
Dept: RADIOLOGY | Facility: HOSPITAL | Age: 34
End: 2024-12-30
Payer: COMMERCIAL

## 2024-12-30 VITALS
HEART RATE: 85 BPM | TEMPERATURE: 97.8 F | OXYGEN SATURATION: 98 % | SYSTOLIC BLOOD PRESSURE: 125 MMHG | RESPIRATION RATE: 18 BRPM | BODY MASS INDEX: 34.36 KG/M2 | WEIGHT: 240 LBS | DIASTOLIC BLOOD PRESSURE: 68 MMHG | HEIGHT: 70 IN

## 2024-12-30 DIAGNOSIS — R07.9 CHEST PAIN: Primary | ICD-10-CM

## 2024-12-30 LAB
ALBUMIN SERPL BCG-MCNC: 4.4 G/DL (ref 3.5–5)
ALP SERPL-CCNC: 37 U/L (ref 34–104)
ALT SERPL W P-5'-P-CCNC: 18 U/L (ref 7–52)
ANION GAP SERPL CALCULATED.3IONS-SCNC: 7 MMOL/L (ref 4–13)
AST SERPL W P-5'-P-CCNC: 16 U/L (ref 13–39)
ATRIAL RATE: 89 BPM
BASOPHILS # BLD AUTO: 0.04 THOUSANDS/ΜL (ref 0–0.1)
BASOPHILS NFR BLD AUTO: 0 % (ref 0–1)
BILIRUB SERPL-MCNC: 0.34 MG/DL (ref 0.2–1)
BUN SERPL-MCNC: 14 MG/DL (ref 5–25)
CALCIUM SERPL-MCNC: 8.9 MG/DL (ref 8.4–10.2)
CARDIAC TROPONIN I PNL SERPL HS: 3 NG/L (ref ?–50)
CHLORIDE SERPL-SCNC: 101 MMOL/L (ref 96–108)
CO2 SERPL-SCNC: 32 MMOL/L (ref 21–32)
CREAT SERPL-MCNC: 0.79 MG/DL (ref 0.6–1.3)
EOSINOPHIL # BLD AUTO: 0.14 THOUSAND/ΜL (ref 0–0.61)
EOSINOPHIL NFR BLD AUTO: 1 % (ref 0–6)
ERYTHROCYTE [DISTWIDTH] IN BLOOD BY AUTOMATED COUNT: 12.3 % (ref 11.6–15.1)
GFR SERPL CREATININE-BSD FRML MDRD: 117 ML/MIN/1.73SQ M
GLUCOSE SERPL-MCNC: 110 MG/DL (ref 65–140)
HCT VFR BLD AUTO: 41.4 % (ref 36.5–49.3)
HGB BLD-MCNC: 14 G/DL (ref 12–17)
IMM GRANULOCYTES # BLD AUTO: 0.05 THOUSAND/UL (ref 0–0.2)
IMM GRANULOCYTES NFR BLD AUTO: 0 % (ref 0–2)
LIPASE SERPL-CCNC: 22 U/L (ref 11–82)
LYMPHOCYTES # BLD AUTO: 1.22 THOUSANDS/ΜL (ref 0.6–4.47)
LYMPHOCYTES NFR BLD AUTO: 10 % (ref 14–44)
MCH RBC QN AUTO: 29.9 PG (ref 26.8–34.3)
MCHC RBC AUTO-ENTMCNC: 33.8 G/DL (ref 31.4–37.4)
MCV RBC AUTO: 88 FL (ref 82–98)
MONOCYTES # BLD AUTO: 1.03 THOUSAND/ΜL (ref 0.17–1.22)
MONOCYTES NFR BLD AUTO: 8 % (ref 4–12)
NEUTROPHILS # BLD AUTO: 10.32 THOUSANDS/ΜL (ref 1.85–7.62)
NEUTS SEG NFR BLD AUTO: 81 % (ref 43–75)
NRBC BLD AUTO-RTO: 0 /100 WBCS
P AXIS: 62 DEGREES
PLATELET # BLD AUTO: 307 THOUSANDS/UL (ref 149–390)
PMV BLD AUTO: 9.5 FL (ref 8.9–12.7)
POTASSIUM SERPL-SCNC: 4.3 MMOL/L (ref 3.5–5.3)
PR INTERVAL: 154 MS
PROT SERPL-MCNC: 7 G/DL (ref 6.4–8.4)
QRS AXIS: 79 DEGREES
QRSD INTERVAL: 100 MS
QT INTERVAL: 366 MS
QTC INTERVAL: 445 MS
RBC # BLD AUTO: 4.69 MILLION/UL (ref 3.88–5.62)
SODIUM SERPL-SCNC: 140 MMOL/L (ref 135–147)
T WAVE AXIS: 57 DEGREES
VENTRICULAR RATE: 89 BPM
WBC # BLD AUTO: 12.8 THOUSAND/UL (ref 4.31–10.16)

## 2024-12-30 PROCEDURE — 84484 ASSAY OF TROPONIN QUANT: CPT | Performed by: EMERGENCY MEDICINE

## 2024-12-30 PROCEDURE — 93005 ELECTROCARDIOGRAM TRACING: CPT

## 2024-12-30 PROCEDURE — 36415 COLL VENOUS BLD VENIPUNCTURE: CPT | Performed by: EMERGENCY MEDICINE

## 2024-12-30 PROCEDURE — 85025 COMPLETE CBC W/AUTO DIFF WBC: CPT | Performed by: EMERGENCY MEDICINE

## 2024-12-30 PROCEDURE — 93010 ELECTROCARDIOGRAM REPORT: CPT | Performed by: INTERNAL MEDICINE

## 2024-12-30 PROCEDURE — 71045 X-RAY EXAM CHEST 1 VIEW: CPT

## 2024-12-30 PROCEDURE — 99285 EMERGENCY DEPT VISIT HI MDM: CPT | Performed by: EMERGENCY MEDICINE

## 2024-12-30 PROCEDURE — 99285 EMERGENCY DEPT VISIT HI MDM: CPT

## 2024-12-30 PROCEDURE — 83690 ASSAY OF LIPASE: CPT | Performed by: EMERGENCY MEDICINE

## 2024-12-30 PROCEDURE — 80053 COMPREHEN METABOLIC PANEL: CPT | Performed by: EMERGENCY MEDICINE

## 2024-12-30 NOTE — ED PROVIDER NOTES
Time reflects when diagnosis was documented in both MDM as applicable and the Disposition within this note       Time User Action Codes Description Comment    12/30/2024 10:52 AM Edd Cowart Add [R07.9] Chest pain           ED Disposition       ED Disposition   Discharge    Condition   Stable    Date/Time   Mon Dec 30, 2024 10:52 AM    Comment   Froylan Veras discharge to home/self care.                   Assessment & Plan       Medical Decision Making  Pulse ox 98% on room air indicating adequate oxygenation.  CXR: NAD as read by me      Differential diagnose include but not limited to arrhythmia, ACS, musculoskeletal pain, GERD    ER findings discussed with the patient and recommended outpatient follow-up primary care physician.  Return if symptoms worsen.    Amount and/or Complexity of Data Reviewed  Labs: ordered.  Radiology: ordered and independent interpretation performed.  ECG/medicine tests: ordered and independent interpretation performed.             Medications - No data to display    ED Risk Strat Scores   HEART Risk Score      Flowsheet Row Most Recent Value   Heart Score Risk Calculator    History 1 Filed at: 12/30/2024 1025   ECG 0 Filed at: 12/30/2024 1025   Age 0 Filed at: 12/30/2024 1025   Risk Factors 1 Filed at: 12/30/2024 1025   Troponin 0 Filed at: 12/30/2024 1025   HEART Score 2 Filed at: 12/30/2024 1025          HEART Risk Score      Flowsheet Row Most Recent Value   Heart Score Risk Calculator    History 1 Filed at: 12/30/2024 1025   ECG 0 Filed at: 12/30/2024 1025   Age 0 Filed at: 12/30/2024 1025   Risk Factors 1 Filed at: 12/30/2024 1025   Troponin 0 Filed at: 12/30/2024 1025   HEART Score 2 Filed at: 12/30/2024 1025                        PERC Rule for PE      Flowsheet Row Most Recent Value   PERC Rule for PE    Age >=50 0 Filed at: 12/30/2024 0927   HR >=100 0 Filed at: 12/30/2024 0927   O2 Sat on room air < 95% 0 Filed at: 12/30/2024 0927   History of PE or DVT 0 Filed at:  "12/30/2024 0927   Recent trauma or surgery 0 Filed at: 12/30/2024 0927   Hemoptysis 0 Filed at: 12/30/2024 0927   Exogenous estrogen 0 Filed at: 12/30/2024 0927   Unilateral leg swelling 0 Filed at: 12/30/2024 0927   PERC Rule for PE Results 0 Filed at: 12/30/2024 0927                                History of Present Illness       Chief Complaint   Patient presents with    Chest Pain     Pt c/o chest pain that started at about 2:30 this AM and lasted about 10 minutes.  Pt reports some SOB at that time.  Pt reports it felt like \"Someone pushing on me\".  Pt reports vomiting just before coming to ER while brushing teeth.  Pt denies any further chest pain or any nausea at this hour, but reports feeling \"off\".         Past Medical History:   Diagnosis Date    Asthma     Reactive airway disease       Past Surgical History:   Procedure Laterality Date    APPENDECTOMY        Family History   Problem Relation Age of Onset    Hypertension Father     Hypertension Mother     Diabetes Mother     Dementia Maternal Grandmother     Mental illness Neg Hx     Substance Abuse Neg Hx       Social History     Tobacco Use    Smoking status: Never    Smokeless tobacco: Current     Types: Chew   Vaping Use    Vaping status: Never Used   Substance Use Topics    Alcohol use: Yes     Alcohol/week: 4.0 standard drinks of alcohol     Types: 2 Glasses of wine, 2 Shots of liquor per week     Comment: socially    Drug use: Never      E-Cigarette/Vaping    E-Cigarette Use Never User       E-Cigarette/Vaping Substances      I have reviewed and agree with the history as documented.     Patient presents for evaluation of chest pain that started at 2:30 AM this morning.  Described as a tightness pressure in his chest that lasted approximately 10 minutes before resolving.  Had some shortness of breath at that time.  Patient now states he feels off in his chest but does not have any pain.  Did vomit once before coming to the ER.      History provided " by:  Patient   used: No        Review of Systems   Cardiovascular:  Positive for chest pain.   All other systems reviewed and are negative.          Objective       ED Triage Vitals   Temperature Pulse Blood Pressure Respirations SpO2 Patient Position - Orthostatic VS   12/30/24 1008 12/30/24 0931 12/30/24 0931 12/30/24 0931 12/30/24 0931 12/30/24 0931   97.8 °F (36.6 °C) 91 136/82 18 97 % Lying      Temp Source Heart Rate Source BP Location FiO2 (%) Pain Score    12/30/24 0931 12/30/24 0931 12/30/24 0931 -- 12/30/24 0931    Tympanic Monitor Right arm  No Pain      Vitals      Date and Time Temp Pulse SpO2 Resp BP Pain Score FACES Pain Rating User   12/30/24 1059 -- 85 98 % 18 125/68 No Pain -- KING   12/30/24 1008 97.8 °F (36.6 °C) -- -- -- -- -- -- KING   12/30/24 0931 -- 91 97 % 18 136/82 No Pain -- KING            Physical Exam  Vitals and nursing note reviewed.   Constitutional:       General: He is not in acute distress.  Cardiovascular:      Rate and Rhythm: Normal rate and regular rhythm.   Pulmonary:      Effort: Pulmonary effort is normal. No respiratory distress.      Breath sounds: Normal breath sounds.   Abdominal:      Tenderness: There is no abdominal tenderness.   Neurological:      General: No focal deficit present.      Mental Status: He is alert and oriented to person, place, and time.         Results Reviewed       Procedure Component Value Units Date/Time    HS Troponin 0hr (reflex protocol) [[id number]]  (Normal) Collected: 12/30/24 0947    Lab Status: Final result Specimen: Blood from Arm, Left Updated: 12/30/24 1016     hs TnI 0hr 3 ng/L     Comprehensive metabolic panel [[id number]] Collected: 12/30/24 0947    Lab Status: Final result Specimen: Blood from Arm, Left Updated: 12/30/24 1009     Sodium 140 mmol/L      Potassium 4.3 mmol/L      Chloride 101 mmol/L      CO2 32 mmol/L      ANION GAP 7 mmol/L      BUN 14 mg/dL      Creatinine 0.79 mg/dL      Glucose 110 mg/dL       Calcium 8.9 mg/dL      AST 16 U/L      ALT 18 U/L      Alkaline Phosphatase 37 U/L      Total Protein 7.0 g/dL      Albumin 4.4 g/dL      Total Bilirubin 0.34 mg/dL      eGFR 117 ml/min/1.73sq m     Narrative:      [employer] guidelines for Chronic Kidney Disease (CKD):     Stage 1 with normal or high GFR (GFR > 90 mL/min/1.73 square meters)    Stage 2 Mild CKD (GFR = 60-89 mL/min/1.73 square meters)    Stage 3A Moderate CKD (GFR = 45-59 mL/min/1.73 square meters)    Stage 3B Moderate CKD (GFR = 30-44 mL/min/1.73 square meters)    Stage 4 Severe CKD (GFR = 15-29 mL/min/1.73 square meters)    Stage 5 End Stage CKD (GFR <15 mL/min/1.73 square meters)  Note: GFR calculation is accurate only with a steady state creatinine    Lipase [[id number]]  (Normal) Collected: 12/30/24 0947    Lab Status: Final result Specimen: Blood from Arm, Left Updated: 12/30/24 1009     Lipase 22 u/L     CBC and differential [[id number]]  (Abnormal) Collected: 12/30/24 0947    Lab Status: Final result Specimen: Blood from Arm, Left Updated: 12/30/24 0953     WBC 12.80 Thousand/uL      RBC 4.69 Million/uL      Hemoglobin 14.0 g/dL      Hematocrit 41.4 %      MCV 88 fL      MCH 29.9 pg      MCHC 33.8 g/dL      RDW 12.3 %      MPV 9.5 fL      Platelets 307 Thousands/uL      nRBC 0 /100 WBCs      Segmented % 81 %      Immature Grans % 0 %      Lymphocytes % 10 %      Monocytes % 8 %      Eosinophils Relative 1 %      Basophils Relative 0 %      Absolute Neutrophils 10.32 Thousands/µL      Absolute Immature Grans 0.05 Thousand/uL      Absolute Lymphocytes 1.22 Thousands/µL      Absolute Monocytes 1.03 Thousand/µL      Eosinophils Absolute 0.14 Thousand/µL      Basophils Absolute 0.04 Thousands/µL     UA (URINE) with reflex to Scope [[id number]]     Lab Status: No result Specimen: Urine             XR chest 1 view portable    (Results Pending)       ECG 12 Lead Documentation Only    Date/Time: 12/30/2024 9:28 AM    Performed by:  Edd Cowart DO  Authorized by: Edd Cowart DO    ECG reviewed by me, the ED Provider: yes    Patient location:  ED  Interpretation:     Interpretation: non-specific    Rate:     ECG rate:  89    ECG rate assessment: normal    Rhythm:     Rhythm: sinus rhythm    Ectopy:     Ectopy: none    Conduction:     Conduction: abnormal      Abnormal conduction: incomplete RBBB    ST segments:     ST segments:  Normal  T waves:     T waves: normal        ED Medication and Procedure Management   Prior to Admission Medications   Prescriptions Last Dose Informant Patient Reported? Taking?   nystatin (MYCOSTATIN) powder   No No   Sig: Apply to the affected areas twice daily      Facility-Administered Medications: None     Discharge Medication List as of 12/30/2024 10:52 AM        CONTINUE these medications which have NOT CHANGED    Details   nystatin (MYCOSTATIN) powder Apply to the affected areas twice daily, Normal           No discharge procedures on file.  ED SEPSIS DOCUMENTATION   Time reflects when diagnosis was documented in both MDM as applicable and the Disposition within this note       Time User Action Codes Description Comment    12/30/2024 10:52 AM Edd Cowart Add [R07.9] Chest pain                  Edd Cowart DO  12/30/24 1201